# Patient Record
Sex: FEMALE | Race: WHITE
[De-identification: names, ages, dates, MRNs, and addresses within clinical notes are randomized per-mention and may not be internally consistent; named-entity substitution may affect disease eponyms.]

---

## 2017-01-05 NOTE — RAD
CHEST TWO VIEWS

1/5/17

 

Comparison is made with the 12/13/15 study. 

 

The heart is normal in size. Calcification is seen in the aortic arch. No lobar consolidations or ef
fusions were seen. It is ever so slightly more hazy in the right lower lobe than it was previously, 
but the finding is borderline at best. I cannot absolutely rule out an early infiltrate here, but th
e finding is truly equivocal. The lungs are otherwise clear. 

 

IMPRESSION:  

Equivocal prominence of a few of the right lower lobe markings which may or may not be significant. 

 

POS: HOME

## 2019-02-17 NOTE — RAD
LUMBAR SPINE THREE VIEWS:

 

02/17/2019

 

FINDINGS:

Scoliosis, convex right, is present.  No acute fracture is seen.  There is no dislocation.  Degenerat
ed disks are noted at multiple levels, including L3-L4, L4-L5, and L5-S1.  Disk space narrowing and o
steophytes are present at each level, as well as vacuum phenomenon.  Anterior osteophytes are promine
nt in the lower lumbar levels.  The aorta is partially calcified.  The SI joints are symmetrical.

 

IMPRESSION:

Scoliosis and advanced degenerative disk disease, particularly in the lower lumbar spine.  Cross-sect
ional imaging would be needed to assess any neural impingement.

 

POS: HOME

## 2019-03-08 NOTE — MRI
NONCONTRAST MRI LUMBAR SPINE:

 

DATE: 3/8/2019.

 

HISTORY: 

Low back pain that radiates to the left hip for 1 month.  Lumbar degenerative disk disease.

 

FINDINGS: 

He visualized retroperitoneal structures demonstrate a normal MRI appearance.

 

There is mild right convex rotoscoliosis of the lumbar spine.  There is heterogeneity of the bone mar
row.  Multilevel degenerative changes are seen throughout the lumbar spine with evidence of vacuum ph
enomenon in the L3-4, L4-5, and L5-S1 intervertebral disks. 

 

Conus medullaris is normal in appearance and terminates at the L2-3 level.

 

T11-12 level:  There is a small central disk protrusion, central spinal canal and neural foramen are 
patent.

 

T12-L1 level:  There is a mild broad-based disk-osteophyte complex present with mild facet degenerati
ve changes.  There is no significant central canal or neural foraminal narrowing.

 

L1-2 level:  There is loss of intervertebral disk height.  A broad-based disk-osteophyte complex is p
resent with mild facet degenerative changes.  There is mild generalized narrowing of the central spin
al canal.  The neural foramina are patent.

 

L2-3 level:  There is loss of intervertebral disk height.  A broad-based disk-osteophyte complex is p
resent with facet hypertrophic changes noted.  Mild generalized narrowing of the central spinal canal
 is present.  The neural foramina appear patent at this level, although there is mild encroachment on
 the left neural foramen.

 

L3-4 level:  There is loss of intervertebral disk height.  End plate degenerative changes are present
 at this level.  There is a broad-based disk-osteophyte complex and facet hypertrophic changes.  Mild
 to moderate narrowing of the central spinal canal is present and there is also narrowing of the late
ral recesses.  The right neural foramen is patent, but there is mild to moderate left-sided neural fo
raminal narrowing present.

 

L4-5 level:  There is loss of intervertebral disk height.  There is a broad-based disk-osteophyte com
plex and end plate degenerative changes.  Facet hypertrophic changes are present.  Mild generalized n
arrowing of the central spinal canal is noted.  Mild left and mild to moderate right-sided neural for
aminal narrowing is present.

 

L5-S1 level:  There is loss of intervertebral disk height.  Broad-based disk-osteophyte complex and f
acet hypertrophic changes are noted.  A prominent right lateral osteophyte is present and there is a 
suggestion of moderate right-sided neural foraminal narrowing.  No significant left-sided neural fora
albert narrowing is present.  There is slight mass effect on the central aspect of the thecal sac with
out significant central canal narrowing.

 

IMPRESSION: 

1.  Multilevel degenerative changes throughout the lumbar spine greatest in the lower lumbar spine wh
ere there is mild to moderate right-sided neural foraminal narrowing at the L4-5 and L5-S1 levels.

 

2.  Mild right convex scoliosis of the lumbar spine.

 

3.  Mild subcutaneous edema in the adipose layer at the L2-3 level.

 

POS: [facility] 02-May-2018

## 2019-05-09 NOTE — ULT
RIGHT LOWER EXTREMITY VENOUS ULTRASOUND

5/9/19

 

Color duplex Doppler ultrasonography of the deep veins of the right lower extremity was performed. Al
l deep veins were freely compressible from groin to ankle and there was normal Doppler responses to a
ugmentation maneuvers. There were a few areas, for example in the common femoral region, we wondered 
if there might be a little residual remaining from her prior clot. I do not have a prior scan to comp
are with however. 

 

IMPRESSION: 

No evidence of DVT. There may be small amounts of residual from a prior clot but there are no finding
s typical of acute clot. 

 

POS: HOME

## 2019-06-03 NOTE — RAD
RADIOGRAPH CHEST 2 VIEWS:



DATE:

6/3/2019



HISTORY:

 84-year-old female with cough



FINDINGS:

There is no airspace density, pulmonary edema, pleural effusion, pneumothorax, or cardiomegaly. Promi
nent interstitial markings at bases of bilateral lower lobes, probably chronic.



IMPRESSION:

No acute cardiopulmonary findings.



Reported By: Americo Smith 

Electronically Signed:  6/3/2019 8:49 AM

## 2019-06-04 NOTE — RAD
CHEST TWO VIEWS:

 

Date: 6-4-19

 

Comparison: 6-3-19

 

FINDINGS: 

There is increased infiltrate in the lung bases posteriorly on the lateral view compared to yesterday
. While some of the basilar markings appear chronic. With increase in markings since yesterday, a sup
erimposed pneumonia is suspected. This is better seen on the lateral view, and difficult to tell whic
h lobe, though the right lower lobe seems a little more suspect. The upper lobes are clear. The heart
 size remains normal and there are no congestive changes. Calcification is seen in the aortic arch. 

 

IMPRESSION: 

Increasing basilar infiltrates behind the heart. Presumed to be a combination of chronic fibrotic evgeny
nges with superimposed acute infection.

 

POS: HOME

## 2019-06-04 NOTE — HP
CHIEF COMPLAINT:  Persistent cough.



HISTORY OF PRESENT ILLNESS:  Ms. Lundy is an 84-year-old  female, who

presented to the emergency department today with complaints of minimally productive

cough with reported onset early last week.  She was evaluated in the ambulatory

setting by Katie Hale, Nurse practitioner, who diagnosed her with postnasal drip

and a nonproductive cough and treated the patient with Flonase and Mucinex DM plus

benzonatate 200 mg.  The patient states she also had diarrhea that started on May

29th.  She had no associated nausea or vomiting.  She did have some sinus pressure

during this period of time, but denies rhinorrhea or ear pain.  She noted some

bloody discharge from the right ear canal on May 31st, but was not associated with

any pain.  The cough apparently worsened along with guarding of the abdomen and

diarrhea, and the patient presented to the [place] ER on June 1st.  At that time,

she was diagnosed with right lower lobe pneumonia based on the clinical exam.  No

imaging was performed at that time, and a perforated right TM.  She was given a

prescription for Levaquin 500 mg daily.  She was discharged to home, but reports

that she continued to cough.  The diarrhea resolved.  She presented back for

re-evaluation today due to associated fatigue.  She states now that the cough is

productive of scant amount of sputum that is slightly discolored.  Otherwise she has

no new complaints. 



PAST MEDICAL HISTORY:  

1. Atrial fibrillation.

2. Hypertension.

3. Hyperlipidemia.

4. History of DVT.

5. Varicose veins.

6. History of diverticulosis.

7. Personal history of colon polyps.

8. Lumbar degenerative arthritis at multiple levels.



PAST SURGICAL HISTORY:  

1. Vein stripping approximately 45 years ago.

2. Tonsillectomy.

3. Removal of throat "cyst" in 2008.

4. 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

5. Right lower extremity squamous cell carcinoma removal in July 2012.

6. Right middle finger release in January 2013.

7. Last colonoscopy, December of 2013.

8. Left hand surgery in 2017.

9. Venous ablation of small saphenous vein in 2017.



SOCIAL HISTORY:  Denies tobacco, alcohol, or illicit drug use.  She is .

Full code status. 



FAMILY HISTORY:  Noncontributory. 



The patient received her Prevnar, August of 2015, and Pneumovax October of 2011, as

well as her yearly influenza vaccination. 



ALLERGIES:  PENICILLIN.



MEDICATIONS:  

1. Mucinex DM 1200/60 one p.o. daily.

2. Warfarin 3 mg p.o. daily.

3. Klor-Con 10 one p.o. daily.

4. Multivitamin one p.o. daily.

5. Magnesium oxide one p.o. b.i.d.

6. Lisinopril/HCTZ 20/25 one p.o. daily.

7. Gabapentin 300 mg two in the morning, one at noon, and three at bedtime.

8. Benzonatate 200 mg p.o. q.8 hours p.r.n. cough.

9. Lipitor 40 mg p.o. daily.

10. Amlodipine 5 mg p.o. daily.

11. Levaquin 500 mg p.o. daily, prescribed June 1st.



REVIEW OF SYSTEMS:  GENERAL:  The patient denies fever.  Positive fatigue and

generalized weakness since onset of her current symptoms. 

HEENT:  Denies any vision changes.  Remainder as per HPI. 

CARDIOVASCULAR:  Denies chest pain, palpitations, orthopnea, or PND.  History of

atrial fibrillation. 

RESPIRATORY:  Denies associated shortness of breath with cough.  No hemoptysis. 

GI:  Last episode of diarrhea was on June 1st without any episodes on June 2nd or

today.  No nausea or vomiting.  Abdominal pain associated with the severe cough to

the right upper quadrant, which was mainly present on June 1st, but has resolved

now. 

GENITOURINARY:  The patient reports an episode of incontinence, which is not

completely out of character for her, but deems that was secondary and associated

with a cough.  Denies dysuria or incomplete voiding. 

LYMPH:  The patient with chronic swelling of the right lower extremity greater than

the left lower extremity secondary to venous stasis. 

HEMATOLOGIC:  Denies bleeding other than the little bit that she noted in the ear

canal late last week. 

PSYCHIATRIC:  Denies depression or anxiety.  She has had some slow progressive

memory decline over the past several years. 

NEUROLOGICAL:  Denies any focal weakness, numbness, or paresthesias.



PHYSICAL EXAMINATION:

VITAL SIGNS:  In the emergency room, blood pressure 125/84, pulse 84, 97% O2

saturation on room air, respirations 19, temperature 98.5, 6/10 pain.  Per the ER

physician, the patient's O2 saturation dropped down to 90% with cough observed.  At

my exam, temperature 99.1, pulse 91, respirations 20, 94% O2 saturation on room air,

and blood pressure 131/71. 

GENERAL:  Well-developed, well-nourished  female, who is alert, oriented to

person, place, and time, in no acute distress. 

HEENT:  Normocephalic and atraumatic.  Pupils are equally round and reactive to

light and accommodation.  Extraocular muscles intact.  Nares are patent without

discharge.  Tongue protrudes in the midline.  Per ED physician, the right TM is

perforated. 

NECK:  Supple without lymphadenopathy, thyromegaly, JVD, or bruit. 

HEART:  Regular rate, but irregular rhythm.  Normal S1 and S2.  No murmurs, clicks,

rubs, or gallops. 

LUNGS:  Crackles to the right base, diminished left base, otherwise clear to

auscultation.  No wheezes or increased work of breathing.  Dry cough noted.  GI:

Positive bowel sounds in all four quadrants.  Soft, nontender, and nondistended.  No

masses, guarding, or rebound tenderness. 

EXTREMITIES:  No cyanosis or clubbing.  Trace ankle edema with venous stasis changes

on the left, 1 mm pretibial edema on the right with venous stasis changes and pink

discoloration. 

NEUROLOGIC:  Cranial nerves 2 through 12 grossly intact.  No focal deficits.



LABORATORY DATA:  White count 9.7 with 76% neutrophils, 15% lymphocytes, hemoglobin

12.6, hematocrit 39.2, platelets 264.  PT 21.6, INR 1.9, PTT 35.5.  Sodium 139,

potassium 3.3, chloride 102, bicarb 28, BUN 18, creatinine 1.26, glucose 117,

calcium 9.2, AST 31, ALT 20, alkaline phosphatase 59, albumin 3.4.  Blood culture x2

and sputum culture and gram stain obtained in the ER pending. 



Chest x-ray shows no acute cardiopulmonary findings, but there are some interstitial

markings at the bilateral bases. 



ASSESSMENT AND PLAN:  

1. Community acquired pneumonia.  The patient essentially has failed outpatient

treatment and will be admitted for IV antibiotics, DuoNebs, followup of cultures,

and imaging.  We will prescribe antitussives p.r.n.  Given the patient's history of

diarrhea, if this reoccurs, may need to get stool studies.  The patient was given

Rocephin and Levaquin in the emergency room.  We will continue Levaquin. 

2. Hypokalemia.  We will give the patient potassium for one dose and repeat her lab

studies in the morning. 

3. Right acute otitis media with perforation of TM.  The antibiotics should cover

this. 

4. Chronic atrial fibrillation.  The patient's warfarin will be continued with

Pharmacy to adjust with a goal INR of 2 to 3. 

5. Remote history of deep venous thrombosis.  Please see problem above.

6. Hyperlipidemia.  The patient's statin will be continued.

7. Hypertension.  The patient's antihypertensives will be continued.

8. Lumbar degenerative disease.  The patient's gabapentin will be continued.

9. Prophylaxis.  We will place the patient on Pepcid and she is already on

anticoagulation. 

10. Code status.  The patient's  desire full code status.







Job ID:  [id number]

## 2019-06-05 NOTE — RAD
CHEST TWO VIEWS:

 

Date: 6-5-19

 

Comparison: 6-4-19

 

FINDINGS: 

Comparison with the prior study shows slight improvement in the retrocardiac infiltrates. There has b
een no substantial worsening in the interval on any of the views. The upper lobes remain clear. There
 are no large effusions. The heart size is normal. 

 

IMPRESSION: 

Very slight improvement since yesterday. 

 

POS: HOME

## 2019-06-07 NOTE — DIS
DATE OF ADMISSION:  06/03/2019



DATE OF DISCHARGE:  06/06/2019



ADMISSION DIAGNOSES:  Community-acquired pneumonia, hypokalemia, chronic atrial

fibrillation, hypertension, dyslipidemia, and right acute otitis media with

perforated tympanic membrane. 



PROCEDURES:  06/03/2019, chest x-ray showed no acute cardiopulmonary findings.

06/04/2019, chest x-ray showed increasing basilar infiltrates behind the heart,

presumed to be a combination of chronic fibrotic changes with superimposed acute

infection.  06/05/2019, chest x-ray showed very slight improvement since 
yesterday. 



HOSPITAL COURSE:  An 84-year-old female, who presented to [facility] Emergency

Department with complaints of upper respiratory infection symptoms including

productive cough, rhinorrhea, and ear pain.  She had been seen in the outpatient

setting prior to her evaluation in the emergency department and was treated

symptomatically with Flonase, Mucinex, and Tessalon Perles.  She was seen 
shortly

thereafter in the emergency department and diagnosed with right lower lobe 
pneumonia 

based on clinical exam, initiated on Levaquin 500 mg p.o. daily, and returned

home, however, again returned back to the emergency department with the 
aforementioned

symptoms.  Secondary to the patient's lack of improvement, she was admitted for

community-acquired pneumonia with initiation of IV antibiotic therapy.  The 
patient

received IV Levaquin daily during her stay.  She remained afebrile with no

leukocytosis.  Her blood cultures have shown no growth beyond 48 hours.  Her

respiratory culture showed moderate normal respiratory kristine present.  Her chest

x-ray has shown improvement as noted.  The patient's productive cough has 
improved

during her stay.  She has remained off supplemental oxygen and is breathing at 
her

baseline.  She is amenable to discharge back to her home setting and complete an

oral course of antibiotics of which she has received just prior to admission; 
she

will complete her oral course of p.o. Levaquin over the next 5 to 6 days at 
home. 



DISPOSITION:  The patient will discharge back to her home setting, where she 
lives

with her .  She may follow up with Dr. Goodman in clinical setting next 
week. 



DISCHARGE MEDICATIONS:  

1. Mucinex DM 1200/60 one p.o. daily.

2. Coumadin 3 mg p.o. daily.

3. Klor-Con 10 mEq p.o. daily.

4. Multivitamin daily.

5. Magnesium oxide p.o. b.i.d.

6. Lisinopril hydrochlorothiazide 20-25 p.o. daily.

7. Gabapentin 300 mg two in the morning, one at noon, and three at bedtime.

8. Tessalon Perles 200 mg q.8 hours p.r.n.

9. Lipitor 40 mg p.o. at bedtime.

10. Amlodipine 5 mg daily.

11. Levaquin 500 mg p.o. daily and to complete her one week course, which will 
be

5-6 tablets at home. 







Job ID:  [id number]



MTDD

## 2019-07-18 NOTE — ULT
BILATERAL LOWER EXTREMITY VENOUS ULTRASOUND 

7/18/19

 

Color duplex Doppler ultrasonography of the deep veins of each lower extremity was measured. Comparis
on is made with a prior study of the right leg from 5/9/19. No echogenic clot was seen on today's exa
m. All veins were freely compressible from groin to ankle. There was no abnormal response to augmenta
tion maneuvers. 

 

IMPRESSION: 

No evidence for DVT today. 

 

POS: HOME

## 2019-07-25 NOTE — ULT
US Renal Bilateral STANDARD: 7/25/2019 12:00 AM



CLINICAL HISTORY: Acute renal failure.



STUDY: Renal ultrasound



COMPARISON: None.                  



FINDINGS:



Right kidney: 

Echogenicity: Normal. 

Masses/cysts:  None.   

Hydronephrosis: None. 

Calcifications: None.  

Length: 11.2 cm



Left kidney: 

Echogenicity: Normal. 

Masses/cysts:  None.   

Hydronephrosis: None. 

Calcifications: None.  

Length: 9.0 cm



Limited visualization of the urinary bladder is unremarkable.



IMPRESSION:

Unremarkable renal ultrasound



Reported By: Edwin Vivar 

Electronically Signed:  7/25/2019 1:07 PM

## 2020-03-11 NOTE — RAD
XR Lumbar Spine 2 Or 3 View



History: Flank pain



Comparison: Radiograph February 2019



Findings: Moderate dextro scoliosis lumbar spine. Multilevel degenerative disc space height loss. No 
acute fracture.



Mild vascular calcifications.



Moderate facet arthrosis throughout the lumbar spine. Advanced degenerative disc space height loss at
 L5/S1.



Impression: Mild progressive dextroscoliosis with multilevel degenerative disc space height loss. No 
acute fracture or malalignment.



Reported By: Ronny Sheikh 

Electronically Signed:  3/10/2020 3:01 PM

## 2020-09-11 NOTE — RAD
RIGHT SHOULDER:

9/11/20

 

Three views.

 

HISTORY:  

Shoulder pain. 

 

There is prominent hypertrophic changes to the AC joint. AC joint alignment is normal. Humeral head i
s normally positioned. No fracture or dislocation identified. 

 

IMPRESSION: 

No acute findings. 

 

POS: [facility]

## 2020-09-19 NOTE — PDOC.HHP
Hospitalist HPI





- History of Present Illness


Abdominal pain


History of Present Illness: 





Patient is 85-year-old female with atrial fibrillation and thromboembolism on 

anticoagulation presented to the emergency room at [facility] with abdominal 

discomfort along with nausea and generalized weakness.





4 days ago patient was evaluated in the emergency room for supratherapeutic INR.

 Her INR was 21.4.  She received vitamin K and was sent home.  Hemoglobin at 

that time was 14.  She has been getting daily INR checks with the most recent 

INR of 7.9 yesterday.  Patient had 2 episodes of black tarry stool yesterday.  

She has generalized abdominal pain which is dull in nature over the last 1 week.

 She denies any aggravating relieving factor.  No fever or chills reported.  She

felt nauseous without any vomiting.  She denies any lightheadedness, dizziness 

or syncope.  No chest pain reported.





Vital signs in the emergency room showed temperature 98.6 with respiration of 18

pulse rate of 72 and blood pressure of 141/72.  She underwent a CT scan of the 

abdomen that was consistent with dilated fluid filled loops of mid to distal 

small bowel consistent with partial small bowel obstruction.  There was minimal 

stranding around upper left colon along with small to medium amount of fluid 

present in abdomen and pelvis most notable around the liver and spleen.  She was

transferred to this facility for hospital admission.








Hospitalist ROS





- Review of Systems


All other systems reviewed; all pertinent +/- noted in HPI/Subj





- Medication


Medications: 


KNOWN ALLERGIES - Penicillins 





CURRENT MEDICATIONS 


Lipitor Sat Sep 19, 2020 13:47 Cullen Quijano RN, Daniel 


TABLET : Strength - 40 mg : ORAL


Patient Dose: 40 mg Oral once a day. 


amLODIPine Sat Sep 19, 2020 13:47 Cullen Quijano RN, Daniel 


TABLET : Strength - 5 mg : ORAL


Patient Dose: 1 tab(s) Oral 2 times a day. 


meTOPROLOL tartrate oral Sat Sep 19, 2020 13:47 Cullen Quijano RN, Daniel 


TABLET : Strength - 25 mg : ORAL


Patient Dose: 1 tab(s) Oral 2 times a day. 


Daily Multiple For Women Sat Sep 19, 2020 13:47 Cullen Quijano RN, Daniel 


TABLET : Strength - 500 mg calcium-18 mg iron-400 mcg : ORAL


Patient Dose: 1 tab(s) Oral once a day. 


magnesium oxide Sat Sep 19, 2020 13:47 Cullen Quijano RN, Daniel 


CAPSULE : Strength - 400 mg : ORAL


Patient Dose: 1 tab(s) Oral 2 times a day. 


warfarin Sat Sep 19, 2020 13:48 Cullen Quijano RN, Daniel 


tablet : Strength - 5 mg : ORAL


Patient Dose: Unknown. 


lisinopril Sat Sep 19, 2020 13:48 Cullen Quijano RN, Daniel 


tablet : Strength - 10 mg : ORAL


Patient Dose: Unknown.





Hospitalist History





- Past Medical History


Cardiac: reports: AFIB, HTN, Hyperlipidemia


Other Medical History: 





History of DVT





- Past Surgical History


Past Surgical History: reports: Tonsillectomy


Other Surgical History: 





Surgery for rectal prolapse, surgical intervention for varicose veins





- Family History


Family History: reports: cancer (Leukemia in her sister), cardiac disorder





- Social History


Smoking Status: Never smoker


Alcohol: reports: None


Drugs: reports: none


Living Situation: With Family (Full codemakes her own decision with the help of

her family)





- Exam


General Appearance: ill appearing


Eye: PERRL, anicteric sclera


ENT: normocephalic atraumatic, no oropharyngeal lesions


Neck: supple, no JVD, no thyromegaly, no lymphadenopathy


Heart: no gallops, no rubs, normal peripheral pulses, irregular


Respiratory: no wheezes, no ronchi, normal chest expansion


Gastrointestinal: soft, non-distended, no guarding, no rigidity, diminished bowl

sounds


Gastrointestinal - other findings: Mild generalized tenderness 


Extremities: no cyanosis, no clubbing, no edema


Extremities - other findings: No calf tenderness


Skin: normal turgor, no lesions


Neurological: cranial nerve grossly intact, normal sensation to touch, no new 

deficit


Musculoskeletal: normal tone, normal strength, generalized weakness


Psychiatric: normal affect, A&O x 3





Hospitalist Results





- Labs


Result Diagrams: 


                                 09/20/20 05:05





                                 09/20/20 05:05


Lab results: 


                                        











WBC  14.2 thou/uL (4.8-10.8)  H  09/19/20  15:54    


 


Hgb  11.4 g/dL (12.0-16.0)  L  09/19/20  15:54    


 


Hct  34.2 % (36.0-47.0)  L  09/19/20  15:54    


 


MCV  96.1 fL (78.0-98.0)   09/19/20  15:54    


 


Plt Count  285 thou/uL (130-400)   09/19/20  15:54    


 


Neutrophils %  71.7 % (42.0-75.0)   09/19/20  15:54    














- EKG Interpretation


EKG: 





Irregular rhythm on telemetry monitorreviewed by me





- Radiology Interpretation


  ** CT scan - abdomen


Status: image reviewed by me


Additional Comment: 





As discussed above





  ** CT scan - head


Status: image reviewed by me


Additional Comment: 





Negative for acute findings





Hospitalist H&P A/P





- Problem


(1) Intra abdominal hemorrhage


Code(s): R58 - HEMORRHAGE, NOT ELSEWHERE CLASSIFIED   Status: Acute   





(2) Partial small bowel obstruction


Code(s): K56.600 - PARTIAL INTESTINAL OBSTRUCTION, UNSPECIFIED AS TO CAUSE   

Status: Acute   





(3) Acute blood loss anemia


Code(s): D62 - ACUTE POSTHEMORRHAGIC ANEMIA   Status: Acute   





(4) Acute diverticulitis


Code(s): K57.92 - DVTRCLI OF INTEST, PART UNSP, W/O PERF OR ABSCESS W/O BLEED   

Status: Suspected   





(5) Chronic anticoagulation


Code(s): Z79.01 - LONG TERM (CURRENT) USE OF ANTICOAGULANTS   Status: Chronic   





(6) Chronic atrial fibrillation


Code(s): I48.20 - CHRONIC ATRIAL FIBRILLATION, UNSPECIFIED   Status: Chronic   





(7) History of DVT (deep vein thrombosis)


Code(s): Z86.718 - PERSONAL HISTORY OF OTHER VENOUS THROMBOSIS AND EMBOLISM   

Status: Chronic   





(8) Hyperlipidemia


Code(s): E78.5 - HYPERLIPIDEMIA, UNSPECIFIED   Status: Chronic   





(9) Hypertension


Code(s): I10 - ESSENTIAL (PRIMARY) HYPERTENSION   Status: Chronic   





- Plan


Plan: 





Patient will be monitored on the medical floor.  We will start her on IV 

hydration.  NG tube has been placed in the emergency room.  Continue NG tube to 

low intermittent suction.  Empiric antibiotics.  IV Protonix 40 twice daily.  

Pain control.  Monitor PT/INR on a daily basis.  COVID-19 screening.  General 

surgery consultation.  Warfarin on hold.  Hold all the oral medications.  PRN 

antihypertensives will be added.  Patient and the family understand the plan of 

care.  Patient is full code.  Spouse is the D POA

## 2020-09-19 NOTE — CT
CT Brain WO Con



History: Supratherapeutic warfarin



Comparison: CT brain 2015



Findings: Extensive chronic microvascular ischemic changes involving the subcortical and deep white m
atter as well as the basal ganglia. No acute hemorrhage or territorial infarction. No midline shift

or mass effect.



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 Calvarium is intact.



Ventricular size and extra-axial CSF spaces are normal for the amount of atrophy.



Impression: Chronic findings. No acute intracranial hemorrhage.



Reported By: Ronny Sheikh 

Electronically Signed:  9/19/2020 7:29 PM

## 2020-09-19 NOTE — RAD
XR Wrist 3 Lt View STANDARD



History: Pain and bruising



Comparison: None.



Findings: Severe degenerative disease of the thumb carpometacarpal joint. Ossification of the triangu
lar fibrocartilage.



No acute displaced fracture or malalignment.



Impression: No acute osseous abnormality. Chronic findings.



Reported By: Ronny Sheikh 

Electronically Signed:  9/19/2020 4:07 PM

## 2020-09-19 NOTE — RAD
XR Chest 1 View Portable



History: Abdominal pain with nausea



Comparison: None



Findings: Likely scar within lingula and right lower lobe. Enteric tube tip below diaphragm although 
out of field of view. Heart size is normal. No significant pleural effusion.



Impression: Enteric tube tip below diaphragm although out of field of view.



Reported By: Ronny Sheikh 

Electronically Signed:  9/19/2020 5:58 PM

## 2020-09-20 NOTE — RAD
ABDOMEN 2 VIEWS:

 

HISTORY: 

Small bowel obstruction.

 

COMPARISON: 

CT, 9/19/2020.

 

FINDINGS: 

NG tube in place.  There are some minimal pleural and parenchymal changes in the lung bases, stable f
rom the prior CT.  There are some persistently dilated loops of small bowel, particularly in the mid 
and lower abdomen with air fluid levels.  There is some gas in the colon.  No free intraperitoneal ai
r.

 

IMPRESSION: 

Overall stable-appearing abnormal gas pattern with dilated small bowel loops with minimal gas in the 
colon.  NG tube in place.  Stable pleural and parenchymal changes in the lung bases.  No free intrape
ritoneal air.

 

POS: OFF

## 2020-09-20 NOTE — PDOC.HOSPP
- Subjective


Encounter Date: 09/20/20


Encounter Time: 11:30


Subjective: 


Patient seen and examined for intra-abdominal bleed with small bowel 

obstruction.  Abdominal pain improving.  No nausea.  Passing little gas.  No 

chest pain or palpitations reported.





- Objective


Vital Signs & Weight: 


                             Vital Signs (12 hours)











  Temp Pulse Resp BP Pulse Ox


 


 09/20/20 08:00      95


 


 09/20/20 07:35  98.2 F  71  16  145/55 H  95


 


 09/20/20 02:00  98.2 F  89  18  165/71 H  95








                                     Weight











Weight                         149 lb














I&O: 


                                        











 09/19/20 09/20/20 09/21/20





 06:59 06:59 06:59


 


Intake Total   975


 


Output Total   100


 


Balance   875











Result Diagrams: 


                                 09/20/20 05:05





                                 09/20/20 05:05


Additional Labs: 


                                Laboratory Tests











  09/20/20





  05:05


 


Potassium  3.4 L


 


Magnesium  1.9











Radiology Reviewed by me: Yes (KUBno new changes)





Hospitalist ROS





- Review of Systems


Respiratory: denies: cough, dry, shortness of breath, hemoptysis, SOB with exce

rtion, pleuritic pain, sputum, wheezing, other


Cardiovascular: denies: chest pain, palpitations, orthopnea, paroxysmal noc. 

dyspnea, edema, light headedness, other


Gastrointestinal: reports: abdominal pain


Genitourinary: denies: dysuria, frequency, incontinence, hematuria, retention, 

other





- Medication


Medications: 


Active Medications











Generic Name Dose Route Start Last Admin





  Trade Name Freq  PRN Reason Stop Dose Admin


 


Potassium Chloride/Dextrose/Sod Cl  1,000 mls @ 125 mls/hr  09/19/20 16:45  

09/20/20 08:47





  D5 1/2 Ns W/20 Meq Kcl  IV   Not Given





  .Q8H EWA  


 


Meropenem 1 gm/ Device  50 mls @ 100 mls/hr  09/19/20 22:00  09/20/20 06:04





  IVPB   50 mls





  0600,1400,2200 EWA   Administration


 


Pantoprazole Sodium  40 mg  09/19/20 21:00  09/20/20 08:47





  Pantoprazole 40 Mg Vial  IVP   40 mg





  Q12HR EWA   Administration














- Exam


General Appearance: NAD


Neck: supple, no JVD


Heart: no gallops, no rubs, normal peripheral pulses, irregular


Respiratory: no wheezes, no rales, no ronchi, normal chest expansion


Gastrointestinal: soft, no guarding, no rigidity, diminished bowl sounds


Gastrointestinal - other findings: Mild generalized tenderness


Extremities: no cyanosis, no clubbing


Neurological: no new deficit


Psychiatric: normal affect, A&O x 3





Hosp A/P


(1) Intra abdominal hemorrhage


Code(s): R58 - HEMORRHAGE, NOT ELSEWHERE CLASSIFIED   Status: Acute   





(2) Partial small bowel obstruction


Code(s): K56.600 - PARTIAL INTESTINAL OBSTRUCTION, UNSPECIFIED AS TO CAUSE   

Status: Acute   





(3) Acute blood loss anemia


Code(s): D62 - ACUTE POSTHEMORRHAGIC ANEMIA   Status: Acute   





(4) Acute diverticulitis


Code(s): K57.92 - DVTRCLI OF INTEST, PART UNSP, W/O PERF OR ABSCESS W/O BLEED   

Status: Suspected   





(5) Chronic anticoagulation


Code(s): Z79.01 - LONG TERM (CURRENT) USE OF ANTICOAGULANTS   Status: Chronic   





(6) Chronic atrial fibrillation


Code(s): I48.20 - CHRONIC ATRIAL FIBRILLATION, UNSPECIFIED   Status: Chronic   





(7) History of DVT (deep vein thrombosis)


Code(s): Z86.718 - PERSONAL HISTORY OF OTHER VENOUS THROMBOSIS AND EMBOLISM   

Status: Chronic   





(8) Hyperlipidemia


Code(s): E78.5 - HYPERLIPIDEMIA, UNSPECIFIED   Status: Chronic   





(9) Hypertension


Code(s): I10 - ESSENTIAL (PRIMARY) HYPERTENSION   Status: Chronic   





- Plan





9/20


INR 2.3 today.  Await small bowel follow-through.  Continue IV fluids.  Replace 

magnesium and potassium.  Continue IV meropenem.  Continue IV PPIs.  Recheck lab

s including PT/INR in a.m. Warfarin on hold.  N.p.o.  Continue NG tube suction. 

Plan discussed with the patient and the family at the bedside.  General surgery 

input appreciated.

## 2020-09-20 NOTE — RAD
EXAM:

XR Small Bowel STANDARD



PROVIDED CLINICAL HISTORY:

Late loops of small bowel. Evaluate for ileus versus partial small bowel obstruction.



COMPARISON:

CT abdomen on 9/19/2020



FINDINGS:

Nasogastric tube is noted in place with tip in the distal body of the stomach. There are dilated loop
s of small bowel seen within the abdomen as well as more normal caliber proximal small bowel loops

also seen. The approximate transit time of contrast through the small bowel is 2 hours. A 3 hour michele
yed image was performed with contrast seen in the region of the rectum.



Small right pleural effusion and atelectasis is present. Right convex scoliosis lumbar spine is prese
nt with degenerative changes in the spine.



IMPRESSION:



1. Partial small bowel obstruction versus ileus. The approximate transit time of contrast through the
 small bowel is 2 hours.

2. Small right pleural effusion.



Reported By: Saad López 

Electronically Signed:  9/20/2020 1:39 PM

## 2020-09-20 NOTE — CON
DATE OF CONSULTATION:  



REASON FOR CONSULT:  Small bowel obstruction  __________for intra-abdominal bleed.



HISTORY OF PRESENT ILLNESS:  Ms. Lundy is an 85-year-old woman with chronic atrial

fibrillation, on Coumadin anticoagulation for this.  She has a history of very

elevated INRs in the past.  Her INR recently was quite high and she was treated with

vitamin K.  Yesterday, her INR had come down to 7.9 and today it was 2.9.  She

states that she has been having some mild abdominal pain for the past month, but no

nausea or vomiting.  It is unclear whether she has had diarrhea or constipation.  It

appears that she has had a little __________ For the past week or so, however, the

abdominal pain has been more severe.   __________seems to be all over.  She is

unable to identify exacerbating or alleviating factors.  It is unclear what her INR

has been  __________ become so elevated.  Her  says that he thinks there may

be sometimes he gives her a double dose of her medication, but the patient states

that he has not done this.  She came to the emergency room because of her abdominal

pain.  She has had some nausea and vomiting, although she has not brought much up.

She states that now she has just gagging.  She is unsure when she last passed gas.

She has had some black stools, which are soft in consistency.  She denies tarry

stools or ones that are difficult to wipe and she has not seen any blood in her

stool.  She has been feeling weak overall, but thinks  __________.  She underwent a

CT, which showed evidence of a possible small bowel obstruction as well as some

ascitic fluid in the abdomen and an area of dense or fluid density in the left upper

quadrant concerning for blood in the abdominal cavity.  The patient denies any

trauma or falls, but as previously stated, her INR has been very elevated recently.

Past medical history of atrial fibrillation.  She denies history of stroke or heart

attack.  She has a history of a blood clot in the vein in her leg, but when asked

for details it sounds like this was a superficial vein  __________.  She has had

vein stripping in her right leg and the right leg has always been more swollen than

the left, even before the vein stripping. 



PAST MEDICAL HISTORY:  Hyperlipidemia.



PAST SURGICAL HISTORY:  Surgery for rectal prolapse, tonsillectomy, and stripping of

veins from the lower leg.  She denies any abdominal surgery and it is unclear

exactly what the prolapse surgery was. 



SOCIAL HISTORY:  The patient does not smoke, drink, or use illicit drugs.  She is

here with her  who does provide some history, but not detailed and

occasionally they have disagreements relating to recent events. 



ALLERGIES:  SHE HAS AN ALLERGY TO PENICILLIN, WHICH CAUSES RASH.



OUTPATIENT MEDICATIONS:  Include,

1. Lipitor 40 mg p.o. daily.

2. Amlodipine 5 mg p.o. b.i.d.

3. Metoprolol 25 mg p.o. b.i.d.

4. __________. 

5. Magnesium oxide 400 mg p.o. b.i.d.

6. Coumadin 5 mg p.o. daily, although this has been held recently due to

supratherapeutic INR. 

7. Lisinopril 10 mg p.o. daily.



FAMILY HISTORY:  Noncontributory.



REVIEW OF SYSTEMS:  Ten system review of systems is negative except per HPI.  She

specifically denies fevers, chills,  __________ dysuria. 



PHYSICAL EXAMINATION:

VITAL SIGNS:   __________ afebrile.  Heart rate in the ER room has been 60s to 80s.

Respiratory rate  __________ normal, blood pressure normal to moderately elevated. 

GENERAL:  Reveals a healthy-appearing woman, in no acute distress.  She has an NG

tube in place with clear nonbloody drainage, nonbilious drainage. 

HEENT:  Unremarkable.  Pupils are equal.  Extraocular movements are symmetric. 

NECK:  Supple without lymphadenopathy or thyroid nodules. 

HEART:  Irregularly irregular, but has a normal tempo.  I do not appreciate any

murmurs, rubs, or gallops. 

LUNGS:  Clear to auscultation bilaterally. 

ABDOMEN:  Soft and nondistended, diffusely tender to palpation with normal bowel

sounds and occasional high-pitched bowel sounds. 

EXTREMITIES:  Warm and well perfused.  She has some chronic edema of her right leg,

but she states it is stable.  Normal pedal pulses. 

NEUROLOGIC: No focal deficits. 

PSYCHIATRIC:  Alert, oriented, and appropriate.   __________ 



INR today is 2.9, it was 21 on the 15th  __________ calculated on the 16th. Her

electrolytes are unremarkable.  BUN and creatinine are 25 and 0.87.  Troponin is

less than 0.01  __________ bilirubin is mildly elevated at 1.6 and it has not been

elevated  __________.  LFTs are normal, however.  __________ the patient does have

_____ of her small bowel.  The terminal ileum appears relatively decompressed.

There are some other loops of small bowel, which appear decompressed in between

loops of enlarged small bowel.  There is not much gas in the colon.  Stomach is not

distended.  She has a small hiatal hernia.  There is some bland fluid in the pelvis

and around the spleen and liver.  In the left lateral abdomen, there is some denser

looking fluid in a lens shaped collection.  It is unclear to me whether this is

intraabdominal or preperitoneal. 



ASSESSMENT:  Intraabdominal bleed, likely spontaneous due to  __________ therapeutic

INR.  I do not believe that the patient is actively bleeding, although it would be

prudent to check serial hematocrit.  Her hemoglobin has dropped 2-3 points from her

baseline, but I do not think the amount of blood seen in the abdomen is adequate to

explain this completely.  She has reported black stools, so gastrointestinal

bleeding is also a concern.  However, she has an NG tube in proper position by x-ray

and there is no blood coming from the NG tube, so I doubt that she is actively

bleeding from her bowels either at this point.  Her INR has been down to 2.9.  Now,

I would not recommend continuing anticoagulation at this point, I do not think she

requires further reversal.  She has received vitamin K and her INR is much better

than it was a few days ago and she continued to decline.  I am uncertain whether the

patient has an actual small bowel obstruction versus ileus, but in either case, I

would recommend NG decompression and bowel rest and Protonix.  We will follow the NG

for the patient's clinical course and consider repeat imaging with contrast.  If her

hemoglobin drops further, I would recommend _____ reversal of her INR, but at this

point, I don't think that is necessary.  ______ mmediate plans for surgery.  I have

discussed the patient's plan of care with her hospitalist to coordinate care between

us. 







Job ID:  [id number]

## 2020-09-20 NOTE — PDOC.GSPN
Surgery Progress Note: Subj





- Subjective


Narrative: 





Patient feels okay except for sore throat.  No nausea.  Abdominal pain is 

better.  She passed a little gas yesterday.





H&H are stable.  INR continues to slowly come down.  Minimal NG output.  Abdomen

is  especially on the left side; about the same as yesterday.





Assessment/plan: Small bowel obstruction versus ileus due to intra-abdominal 

bleed.  Over-anticoagulated state is resolving and no evidence of active 

bleeding.  Continue bowel rest today.  I have ordered a small bowel follow-

through for the morning.





Surgery Progress Note: Obj





- Vital signs


Vital signs: 


                            Vital Signs - Most Recent











Temp Pulse Resp BP Pulse Ox


 


 97.7 F   86   16   128/61   97 


 


 09/20/20 11:11  09/20/20 11:11  09/20/20 11:11  09/20/20 11:11  09/20/20 12:00














Surgery Progress Note: Results





- Labs


Result Diagrams: 


                                 09/20/20 05:05





                                 09/20/20 05:05


Lab results: 


                         Laboratory Results - last 24 hr











  09/19/20 09/20/20 09/20/20





  20:04 05:05 05:05


 


WBC    10.0


 


RBC    3.46 L


 


Hgb    11.1 L


 


Hct    33.4 L


 


MCV    96.5


 


MCH    32.0 H


 


MCHC    33.1


 


RDW    12.0


 


Plt Count    259


 


MPV    8.3


 


Neutrophils %    69.8


 


Lymphocytes %    19.4 L


 


Monocytes %    7.5


 


Eosinophils %    2.8


 


Basophils %    0.6


 


Neutrophils #    7.0 H


 


Lymphocytes #    1.9


 


Monocytes #    0.8 H


 


Eosinophils #    0.3


 


Basophils #    0.1


 


PT   


 


INR   


 


APTT   


 


Sodium   136 


 


Potassium   3.4 L 


 


Chloride   106 


 


Carbon Dioxide   22 L 


 


Anion Gap   11 


 


BUN   18 


 


Creatinine   0.74 


 


Estimated GFR (MDRD)   75 


 


Glucose   154 H 


 


Calcium   7.8 


 


Phosphorus   2.7 


 


Magnesium   1.9 


 


Total Bilirubin   1.5 H 


 


AST   19 


 


ALT   13 


 


Alkaline Phosphatase   68 


 


C-Reactive Protein   3.56 H 


 


Serum Total Protein   5.4 L 


 


Albumin   2.9 L 


 


Globulin   2.5 


 


Albumin/Globulin Ratio   1.2 


 


SARS-CoV-2 (PCR)  Not Detected  














  09/20/20





  05:05


 


WBC 


 


RBC 


 


Hgb 


 


Hct 


 


MCV 


 


MCH 


 


MCHC 


 


RDW 


 


Plt Count 


 


MPV 


 


Neutrophils % 


 


Lymphocytes % 


 


Monocytes % 


 


Eosinophils % 


 


Basophils % 


 


Neutrophils # 


 


Lymphocytes # 


 


Monocytes # 


 


Eosinophils # 


 


Basophils # 


 


PT  24.9 H


 


INR  2.3


 


APTT  39.4 H


 


Sodium 


 


Potassium 


 


Chloride 


 


Carbon Dioxide 


 


Anion Gap 


 


BUN 


 


Creatinine 


 


Estimated GFR (MDRD) 


 


Glucose 


 


Calcium 


 


Phosphorus 


 


Magnesium 


 


Total Bilirubin 


 


AST 


 


ALT 


 


Alkaline Phosphatase 


 


C-Reactive Protein 


 


Serum Total Protein 


 


Albumin 


 


Globulin 


 


Albumin/Globulin Ratio 


 


SARS-CoV-2 (PCR)

## 2020-09-21 NOTE — PDOC.HOSPP
- Subjective


Encounter Date: 09/21/20


Encounter Time: 08:00


Subjective: 


Patient seen in follow-up for ileus.  Reports having small bowel movements as 

well as passing flatus.  Denies any abdominal pain.  Denies any chest pain or 

shortness of breath.





- Objective


Vital Signs & Weight: 


                             Vital Signs (12 hours)











  Temp Pulse Resp BP Pulse Ox


 


 09/21/20 12:12  97.4 F L  91  18  117/67  97


 


 09/21/20 08:04  98.1 F  94  16  147/75 H  95


 


 09/21/20 07:56      97


 


 09/21/20 03:23  97.9 F  77  16  152/66 H  97








                                     Weight











Weight                         149 lb














I&O: 


                                        











 09/20/20 09/21/20 09/22/20





 06:59 06:59 06:59


 


Intake Total  3675 


 


Output Total  650 


 


Balance  3025 











Result Diagrams: 


                                 09/21/20 04:44





                                 09/21/20 04:44


Additional Labs: 


I reviewed patient's labs and MAR





Hospitalist ROS





- Review of Systems


Cardiovascular: denies: chest pain, palpitations, orthopnea, paroxysmal noc. 

dyspnea, edema, light headedness


Gastrointestinal: denies: nausea, vomiting, abdominal pain, diarrhea, 

constipation, melena, hematochezia





- Medication


Medications: 


Active Medications











Generic Name Dose Route Start Last Admin





  Trade Name Freq  PRN Reason Stop Dose Admin


 


Potassium Chloride/Dextrose/Sod Cl  1,000 mls @ 125 mls/hr  09/19/20 16:45  

09/21/20 07:06





  D5 1/2 Ns W/20 Meq Kcl  IV   1,000 mls





  .Q8H EWA   Administration


 


Meropenem 1 gm/ Device  50 mls @ 100 mls/hr  09/19/20 22:00  09/21/20 05:14





  IVPB   50 mls





  0600,1400,2200 EWA   Administration


 


Pantoprazole Sodium  40 mg  09/19/20 21:00  09/21/20 09:38





  Pantoprazole 40 Mg Vial  IVP   40 mg





  Q12HR EWA   Administration














- Exam


General Appearance: awake alert


Eye: anicteric sclera


ENT: moist mucosa


Neck: supple


Heart: irregular


Respiratory: CTAB


Gastrointestinal: soft, non-tender, normal bowel sounds


Skin: no rashes


Psychiatric: normal affect, normal behavior





Hosp A/P





- Plan








-Assessment





(1) ileus


Status: Acute





(2) Intra abdominal hemorrhage


Code(s): R58 - HEMORRHAGE, NOT ELSEWHERE CLASSIFIED   Status: Acute   





(3) Partial small bowel obstruction


Code(s): K56.600 - PARTIAL INTESTINAL OBSTRUCTION, UNSPECIFIED AS TO CAUSE   

Status: Suspected, resolved   





(4) Acute blood loss anemia


Code(s): D62 - ACUTE POSTHEMORRHAGIC ANEMIA   Status: Acute   





(5) Chronic anticoagulation


Code(s): Z79.01 - LONG TERM (CURRENT) USE OF ANTICOAGULANTS   Status: Chronic   





(6) Chronic atrial fibrillation


Code(s): I48.20 - CHRONIC ATRIAL FIBRILLATION, UNSPECIFIED   Status: Chronic   





(7) History of DVT (deep vein thrombosis)


Code(s): Z86.718 - PERSONAL HISTORY OF OTHER VENOUS THROMBOSIS AND EMBOLISM   

Status: Chronic   





(8) Hyperlipidemia


Code(s): E78.5 - HYPERLIPIDEMIA, UNSPECIFIED   Status: Chronic   





(9) Hypertension


Code(s): I10 - ESSENTIAL (PRIMARY) HYPERTENSION   Status: Chronic   





(10) Acute diverticulitis


Code(s): K57.92 - DVTRCLI OF INTEST, PART UNSP, W/O PERF OR ABSCESS W/O BLEED   

Status: Ruled out   





- Plan


Patient clinically improving.  Most likely the etiology of her symptoms was 

ileus secondary to intra-abdominal bleeding, partial small bowel obstruction 

less likely.


He has been started on clear fluid diet, to be advanced as tolerated.


We will request cardiology service input regarding the need for anticoagulation.


Will resume metoprolol for now, given her history of atrial fibrillation.  If 

blood pressure is stable, will consider resuming lisinopril and amlodipine.


Resume statin.

## 2020-09-21 NOTE — CON
DATE OF CONSULTATION:  



REASON FOR CONSULTATION:  Supratherapeutic INR.



HISTORY OF PRESENT ILLNESS:  Ms. Lundy is an 85-year-old woman, whom I have seen

and evaluated in the past.  She has a previous history of DVT and paroxysmal atrial

fibrillation.  She has been on Coumadin therapy. 



She recently had a supratherapeutic INR of 21.  She was not bleeding at the time.

She then developed abdominal pain and had intraabdominal hemorrhage.  She is

currently stable.  She has less abdominal discomfort. 



After talking to her and her daughter, it appears there was some confusion on how

much Coumadin she was receiving.  There was a mixup between her Coumadin and her

's Coumadin.  It was likely the precipitating event. 



HOME MEDICATIONS:  Include;

1. Atorvastatin.

2. Metoprolol.

3. Multivitamin.

4. Coumadin.

5. Magnesium.

6. Amlodipine.

7. Lisinopril.



PAST MEDICAL HISTORY:  Skin cancer, paroxysmal atrial fibrillation, hypertension,

hyperlipidemia, previous DVT, tonsillectomy, hemorrhoidectomy. 



FAMILY HISTORY:  Negative for CAD.



SOCIAL HISTORY:  No current tobacco or alcohol use.



ALLERGIES:  PENICILLIN.



REVIEW OF SYSTEMS:  A 10-point review of systems is reviewed as above, otherwise

negative. 



PHYSICAL EXAMINATION:

GENERAL:  Patient is a pleasant female, who is in no acute distress.  The patient

appears their stated age. 

VITAL SIGNS:  Blood pressure 117/67, pulse 91, temperature 97.4.   

NEUROLOGIC:  The patient is alert and oriented x3 with no focal neurologic deficits. 

HEENT:  Sclerae without icterus.  Mouth has moist mucous membranes with normal

pallor. 

NECK:  No JVD.  Carotid upstroke brisk.  No bruits bilaterally. 

LUNGS:  Clear to auscultation with unlabored respirations. 

BACK:  No scoliosis or kyphosis. 

CARDIAC:  Regular rate and rhythm with normal S1 and S2.  No S3 or S4 noted.  No

significant rubs, murmurs, thrills, or gallops noted throughout the precordium.  PMI

is not displaced.  There is no parasternal heave. 

ABDOMEN:  Soft, nontender, nondistended.  No peritoneal signs present.  No

hepatosplenomegaly.  No abnormal striae. 

EXTREMITIES:  2+ femoral and 2+ dorsalis pedis pulses.  No cyanosis, clubbing, or

edema. 

SKIN:  No gross abnormalities.



IMPRESSION:  

1. Supratherapeutic INR.

2. Abdominal hemorrhage.

3. Atrial fibrillation.

4. Status post DVT.



RECOMMENDATIONS:  It appears Ms. Lundy's INR is back down to reasonable

therapeutic range.  Discussed switching to a novel oral anticoagulant.  She is

amenable.  We would like to do this as an outpatient.  We would recommend restarting

Coumadin when okay with General Surgery.  I would recommend switching her Coumadin

to novel oral anticoagulation therapy as an outpatient after things stabilized.

Otherwise, I have no recommendations.  We will set up an appointment with me in the

next 2 to 3 weeks in the office. 







Job ID:  [id number]

## 2020-09-21 NOTE — PDOC.GSPN
Surgery Progress Note: Subj





- Subjective


Narrative: 





Patient is feeling okay this morning.  Her abdominal pain is gone.  No nausea or

vomiting.  She has had flatus and bowel movements.





Small bowel follow-through was ordered for this morning but was actually 

performed yesterday.  Unfortunately I was not informed at that time, but there 

was fairly rapid transit of contrast through the small intestine without any 

high-grade obstruction.  The bowel looks a little dilated consistent with an 

ileus but there was contrast in the colon probably by 1 hour but definitely by 2

hours.  Abdomen is not distended or tender.





Assessment/plan: Partial small bowel obstruction versus ileus.  I favor ileus 

due to intra-abdominal bleeding, but in either case it appears to have resolved.

 I removed her NG tube in order to clear liquid diet.  If she tolerates that she

can advance to fulls.  If she tolerates a full liquid diet she can go home on 

this and advance over the next few days to a soft diet and then to regular.  She

can follow-up with me on an as-needed basis.  Anticoagulation per her medicine 

team.  I think from a surgical standpoint it would likely be okay to restart 

anticoagulation as long as we avoid over-anticoagulation.





Surgery Progress Note: Obj





- Vital signs


Vital signs: 


                            Vital Signs - Most Recent











Temp Pulse Resp BP Pulse Ox


 


 98.1 F   94   16   147/75 H  95 


 


 09/21/20 08:04  09/21/20 08:04  09/21/20 08:04  09/21/20 08:04  09/21/20 08:04














Surgery Progress Note: Results





- Labs


Result Diagrams: 


                                 09/21/20 04:44





                                 09/21/20 04:44


Lab results: 


                         Laboratory Results - last 24 hr











  09/21/20 09/21/20 09/21/20





  04:44 04:44 04:44


 


WBC   9.9 


 


RBC   3.28 L 


 


Hgb   10.7 L 


 


Hct   31.4 L 


 


MCV   95.7 


 


MCH   32.5 H 


 


MCHC   33.9 


 


RDW   12.1 


 


Plt Count   255 


 


MPV   8.1 


 


Neutrophils %   69.8 


 


Lymphocytes %   18.4 L 


 


Monocytes %   8.3 


 


Eosinophils %   3.0 


 


Basophils %   0.5 


 


Neutrophils #   6.9 H 


 


Lymphocytes #   1.8 


 


Monocytes #   0.8 H 


 


Eosinophils #   0.3 


 


Basophils #   0.0 


 


PT    21.7 H


 


INR    1.9


 


APTT    35.0


 


Sodium  138  


 


Potassium  3.4 L  


 


Chloride  107  


 


Carbon Dioxide  22 L  


 


Anion Gap  12  


 


BUN  12  


 


Creatinine  0.70  


 


Estimated GFR (MDRD)  80  


 


Glucose  155 H  


 


Calcium  7.8  


 


Phosphorus  1.8 L  


 


Magnesium  2.0  


 


Total Bilirubin  1.3 H  


 


AST  22  


 


ALT  13  


 


Alkaline Phosphatase  65  


 


Serum Total Protein  5.5 L  


 


Albumin  3.0 L  


 


Globulin  2.5  


 


Albumin/Globulin Ratio  1.2

## 2020-09-22 NOTE — PDOC.GSPN
Surgery Progress Note: Subj





- Subjective


Narrative: 





Patient is on a full liquid diet but somehow got a steak last night.  She did 

tolerate this okay.  No abdominal pain or nausea.  She is passing gas and having

bowel movements.  Abdomen is soft nontender nondistended.





Assessment/plan: Small bowel obstruction versus ileus, favor ileus due to intra-

abdominal bleeding.  Her hemoglobin has been stable and I do not believe that 

she is at significant risk for rebleeding.  I think it would be fine to restart 

her Coumadin at this time.  Dr. Park does plan to start her on a novel 

anticoagulant as an outpatient.  I have advanced her to a GI soft diet.  No new 

recommendations.





Surgery Progress Note: Obj





- Vital signs


Vital signs: 


                            Vital Signs - Most Recent











Temp Pulse Resp BP Pulse Ox


 


 98.0 F   89   18   145/83 H  96 


 


 09/22/20 08:08  09/22/20 08:08  09/22/20 08:08  09/22/20 08:08  09/22/20 08:08














Surgery Progress Note: Results





- Labs


Result Diagrams: 


                                 09/22/20 04:47





                                 09/22/20 04:47


Lab results: 


                         Laboratory Results - last 24 hr











  09/22/20 09/22/20 09/22/20





  04:47 04:47 04:47


 


WBC   8.9 


 


RBC   3.35 L 


 


Hgb   10.7 L 


 


Hct   32.8 L 


 


MCV   97.9 


 


MCH   31.8 H 


 


MCHC   32.5 


 


RDW   12.6 


 


Plt Count   269 


 


MPV   8.0 


 


Neutrophils %   69.3 


 


Lymphocytes %   18.1 L 


 


Monocytes %   8.1 


 


Eosinophils %   4.1 


 


Basophils %   0.4 


 


Neutrophils #   6.2 


 


Lymphocytes #   1.6 


 


Monocytes #   0.7 H 


 


Eosinophils #   0.4 


 


Basophils #   0.0 


 


PT    20.1 H


 


INR    1.7


 


Sodium  141  


 


Potassium  3.8  


 


Chloride  111 H  


 


Carbon Dioxide  24  


 


Anion Gap  10  


 


BUN  12  


 


Creatinine  0.69  


 


Estimated GFR (MDRD)  81  


 


Glucose  146 H  


 


Calcium  7.8  


 


Phosphorus  2.5  


 


Magnesium  1.8  


 


Total Bilirubin  1.3 H  


 


AST  21  


 


ALT  14  


 


Alkaline Phosphatase  66  


 


Serum Total Protein  5.1 L  


 


Albumin  2.8 L  


 


Globulin  2.3 L  


 


Albumin/Globulin Ratio  1.2

## 2020-09-23 NOTE — DIS
DATE OF ADMISSION:  09/19/2020



DATE OF DISCHARGE:  09/22/2020



PRIMARY CARE PROVIDER:  Dr. Sourav Todd.



DISCHARGE DIAGNOSES:  

1. Ileus.

2. Intraabdominal bleed.

3. Diverticulosis.

4. Hepatic cyst.

5. Hypokalemia.

6. COVID-19 PCR test negative.

7. Acute blood loss anemia.

8. Supratherapeutic INR.



CONDITION OF PATIENT ON THE DAY OF DISCHARGE:  Stable.  I assessed Ms. Lundy on

the day of discharge.  She denies any chest pain or shortness of breath.  Vital

signs are stable.  S1 and S2 are heard, regular.  Lungs are clear to auscultation

bilaterally. 



CONSULTATIONS DURING THIS HOSPITALIZATION:  General Surgery, Dr. Cunningham;

Cardiology, Dr. Park. 



HOSPITAL COURSE:  Ms. Lundy is a pleasant 85-year-old lady, who was admitted to

[facility] on September 19, 2020, for suspected small-bowel

obstruction, which eventually turned out to be most likely ileus.  This was in the

context of intraabdominal bleeding secondary to supratherapeutic INR.  Her INR was

21.4 four days prior to the admission.  She was seen by General Surgery Service.

She underwent conservative management.  She had NG tube to suction.  A followup

small bowel x-rays on September 20th suggested partial small-bowel obstruction

versus ileus.  The approximate transit time of contrast through the small bowel was

2 hours. 



She started passing flatus and had bowel movements.  She has been cleared for

discharge by General Surgery Service. 



Cardiology Service was also consulted to request an opinion regarding warfarin

therapy.  The plan to transition her to novel oral anticoagulant agent as outpatient

and recommended continuing Coumadin for now.  General Surgery Service is agreeable

to continuing Coumadin as well.  She is advised to have her PT/INR checked in 3

days' time through primary care provider. 



LABORATORY DATA:  On the day of discharge, she has hemoglobin 10.7, white count

8900, and platelet count 269,000.  INR 1.7.  Sodium 141, potassium 3.8, and

creatinine 0.69. 



No change has been made to her pre-admission home medications, including warfarin. 



Many thanks for allowing me to participate in your patient's care.  Please feel free

to contact me with any questions or concerns. 



FOLLOWUP:  Post acute care followup:  With primary care provider in 3 days and with

Cardiology, Dr. Park in 2 to 3 weeks. 



ACTIVITY:  As tolerated.



DIET:  Coumadin prudent and heart-healthy.



DISCHARGE DESTINATION:  Home.



TIME SPENT:  Total amount of time spent coordinating this discharge:  Thirty-two

minutes. 







Job ID:  [id number]

## 2021-02-26 NOTE — RAD
Exam: Lumbar spine 3 views



COMPARISON: 3/10/2020



HISTORY: Intermittent pain.



FINDINGS: 5 lumbar type vertebra. Stable rightward curvature of the lumbar spine. Lumbar spine verteb
ral body height is maintained. No fracture. Vacuum disc phenomenon at L3-L4. Prominent osteophyte

formation at L3-L4, L4-L5 and L5-S1. There is vacuum disc phenomenon with severe loss of disc space h
eight in the lumbar sacral junction.

There is facet hypertrophy at L3, L4 and L5

Visualized sacrum and bony pelvis are intact.



IMPRESSION:

1. No fracture

2. 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



Reported By: Verito Yousif 

Electronically Signed:  2/26/2021 7:36 PM

## 2022-04-27 ENCOUNTER — HOSPITAL ENCOUNTER (OUTPATIENT)
Dept: HOSPITAL 92 - BICULT | Age: 87
Discharge: HOME | End: 2022-04-27
Payer: MEDICARE

## 2022-04-27 DIAGNOSIS — R10.84: Primary | ICD-10-CM

## 2022-04-27 DIAGNOSIS — K76.89: ICD-10-CM

## 2022-04-27 PROCEDURE — 76700 US EXAM ABDOM COMPLETE: CPT

## 2022-12-28 ENCOUNTER — HOSPITAL ENCOUNTER (EMERGENCY)
Dept: HOSPITAL 92 - ERS | Age: 87
Discharge: HOME | End: 2022-12-28
Payer: MEDICARE

## 2022-12-28 ENCOUNTER — HOSPITAL ENCOUNTER (EMERGENCY)
Dept: HOSPITAL 57 - BURERS | Age: 87
Discharge: LEFT BEFORE DISCHARGE | End: 2022-12-28
Payer: MEDICARE

## 2022-12-28 DIAGNOSIS — R60.0: Primary | ICD-10-CM

## 2022-12-28 DIAGNOSIS — Z53.21: Primary | ICD-10-CM

## 2022-12-28 LAB
ALBUMIN SERPL BCG-MCNC: 3.6 G/DL (ref 3.4–4.8)
ALP SERPL-CCNC: 87 U/L (ref 40–110)
ALT SERPL W P-5'-P-CCNC: 16 U/L (ref 8–55)
ANION GAP SERPL CALC-SCNC: 13 MMOL/L (ref 10–20)
APTT PPP: 33.9 SEC (ref 22.9–36.1)
AST SERPL-CCNC: 17 U/L (ref 5–34)
BASOPHILS # BLD AUTO: 0 THOU/UL (ref 0–0.2)
BASOPHILS NFR BLD AUTO: 0.4 % (ref 0–1)
BILIRUB SERPL-MCNC: 0.6 MG/DL (ref 0.2–1.2)
BUN SERPL-MCNC: 12 MG/DL (ref 9.8–20.1)
CALCIUM SERPL-MCNC: 8.7 MG/DL (ref 7.8–10.44)
CHLORIDE SERPL-SCNC: 107 MMOL/L (ref 98–107)
CO2 SERPL-SCNC: 24 MMOL/L (ref 23–31)
CREAT CL PREDICTED SERPL C-G-VRATE: 0 ML/MIN (ref 70–130)
EOSINOPHIL # BLD AUTO: 0.2 THOU/UL (ref 0–0.7)
EOSINOPHIL NFR BLD AUTO: 3.1 % (ref 0–10)
GLOBULIN SER CALC-MCNC: 3 G/DL (ref 2.4–3.5)
GLUCOSE SERPL-MCNC: 137 MG/DL (ref 83–110)
HGB BLD-MCNC: 14.2 G/DL (ref 12–16)
INR PPP: 1.2
LYMPHOCYTES # BLD: 2.1 THOU/UL (ref 1.2–3.4)
LYMPHOCYTES NFR BLD AUTO: 32.7 % (ref 21–51)
MCH RBC QN AUTO: 31.4 PG (ref 27–31)
MCV RBC AUTO: 95.4 FL (ref 78–98)
MONOCYTES # BLD AUTO: 0.6 THOU/UL (ref 0.11–0.59)
MONOCYTES NFR BLD AUTO: 8.8 % (ref 0–10)
NEUTROPHILS # BLD AUTO: 3.5 THOU/UL (ref 1.4–6.5)
NEUTROPHILS NFR BLD AUTO: 54.9 % (ref 42–75)
PLATELET # BLD AUTO: 231 10X3/UL (ref 130–400)
POTASSIUM SERPL-SCNC: 3.1 MMOL/L (ref 3.5–5.1)
PROTHROMBIN TIME: 15.9 SEC (ref 12–14.7)
RBC # BLD AUTO: 4.53 MILL/UL (ref 4.2–5.4)
SODIUM SERPL-SCNC: 141 MMOL/L (ref 136–145)
WBC # BLD AUTO: 6.4 10X3/UL (ref 4.8–10.8)

## 2022-12-28 PROCEDURE — 85025 COMPLETE CBC W/AUTO DIFF WBC: CPT

## 2022-12-28 PROCEDURE — 80053 COMPREHEN METABOLIC PANEL: CPT

## 2022-12-28 PROCEDURE — 85610 PROTHROMBIN TIME: CPT

## 2022-12-28 PROCEDURE — 36415 COLL VENOUS BLD VENIPUNCTURE: CPT

## 2022-12-28 PROCEDURE — 85730 THROMBOPLASTIN TIME PARTIAL: CPT

## 2024-02-28 ENCOUNTER — HOSPITAL ENCOUNTER (EMERGENCY)
Dept: HOSPITAL 57 - BURERS | Age: 89
Discharge: HOME | End: 2024-02-28
Payer: MEDICARE

## 2024-02-28 DIAGNOSIS — Z79.84: ICD-10-CM

## 2024-02-28 DIAGNOSIS — Z79.01: ICD-10-CM

## 2024-02-28 DIAGNOSIS — Z79.899: ICD-10-CM

## 2024-02-28 DIAGNOSIS — E78.00: ICD-10-CM

## 2024-02-28 DIAGNOSIS — I48.91: ICD-10-CM

## 2024-02-28 DIAGNOSIS — H10.12: Primary | ICD-10-CM

## 2024-02-28 DIAGNOSIS — I10: ICD-10-CM

## 2024-02-28 PROCEDURE — 99283 EMERGENCY DEPT VISIT LOW MDM: CPT
